# Patient Record
Sex: MALE | Race: WHITE | NOT HISPANIC OR LATINO | Employment: UNEMPLOYED | URBAN - METROPOLITAN AREA
[De-identification: names, ages, dates, MRNs, and addresses within clinical notes are randomized per-mention and may not be internally consistent; named-entity substitution may affect disease eponyms.]

---

## 2017-02-19 ENCOUNTER — HOSPITAL ENCOUNTER (EMERGENCY)
Facility: HOSPITAL | Age: 27
Discharge: HOME | End: 2017-02-19

## 2017-02-19 VITALS
BODY MASS INDEX: 23.86 KG/M2 | SYSTOLIC BLOOD PRESSURE: 154 MMHG | DIASTOLIC BLOOD PRESSURE: 95 MMHG | HEART RATE: 100 BPM | RESPIRATION RATE: 20 BRPM | WEIGHT: 180 LBS | HEIGHT: 73 IN | OXYGEN SATURATION: 100 % | TEMPERATURE: 98 F

## 2017-02-19 DIAGNOSIS — R07.9 CHEST PAIN: ICD-10-CM

## 2017-02-19 DIAGNOSIS — R07.89 CHEST WALL PAIN: Primary | ICD-10-CM

## 2017-02-19 PROCEDURE — 25000003 PHARM REV CODE 250: Performed by: PHYSICIAN ASSISTANT

## 2017-02-19 PROCEDURE — 99284 EMERGENCY DEPT VISIT MOD MDM: CPT | Mod: 25

## 2017-02-19 PROCEDURE — 93010 ELECTROCARDIOGRAM REPORT: CPT | Mod: ,,, | Performed by: INTERNAL MEDICINE

## 2017-02-19 PROCEDURE — 93005 ELECTROCARDIOGRAM TRACING: CPT

## 2017-02-19 RX ORDER — DICLOFENAC SODIUM 75 MG/1
75 TABLET, DELAYED RELEASE ORAL 2 TIMES DAILY
Qty: 20 TABLET | Refills: 0 | Status: SHIPPED | OUTPATIENT
Start: 2017-02-19

## 2017-02-19 RX ORDER — HYDROXYZINE PAMOATE 25 MG/1
25 CAPSULE ORAL
Status: COMPLETED | OUTPATIENT
Start: 2017-02-19 | End: 2017-02-19

## 2017-02-19 RX ORDER — ACETAMINOPHEN 500 MG
1000 TABLET ORAL 3 TIMES DAILY PRN
Qty: 30 TABLET | Refills: 0
Start: 2017-02-19

## 2017-02-19 RX ORDER — IBUPROFEN 200 MG
400 TABLET ORAL EVERY 6 HOURS PRN
Qty: 30 TABLET | Refills: 0
Start: 2017-02-19

## 2017-02-19 RX ADMIN — HYDROXYZINE PAMOATE 25 MG: 25 CAPSULE ORAL at 03:02

## 2017-02-19 NOTE — ED NOTES
Pt reports chest pain and shortness of breath began last night after using meth.  States that both symptoms have decreased in severity since that time.

## 2017-02-19 NOTE — DISCHARGE INSTRUCTIONS
Chest Wall Pain: Costochondritis    The chest pain that you have had today is caused by costochondritis. This condition is caused by an inflammation of the cartilage joining your ribs to your breastbone. It is not caused by heart or lung problems. The inflammation may have been brought on by a blow to the chest, lifting heavy objects, intense exercise, or an illness that made you cough and sneeze. It often occurs during times of emotional stress. It can be painful, but it is not dangerous. It usually goes away in 1 to 2 weeks. But it may happen again. Rarely, a more serious condition may cause symptoms similar to costochondritis. Thats why its important to watch for the warning signs listed below.  Home care  Follow these guidelines when caring for yourself at home:  · If you feel that emotional stress is a cause of your condition, try to figure out the sources of that stress. It may not be obvious! Learn ways to deal with the stress in your life. This can include regular exercise, muscle relaxation, meditation, or simply taking time out for yourself. For more information about this, talk with your health care provider. Or go to your local library and look at books on stress reduction.  · You may use acetaminophen or ibuprofen to control pain, unless another pain medicine was prescribed. If you have liver disease or ever had a stomach ulcer, talk with your health care provider before using these medicines.  · You can also help ease pain by using a hot, wet compress or heating pad. Use this with or without a medicated skin cream that helps relieves pain.  · Do stretching exercise as advised by your provider.  · Take any prescribed medicines as directed.  Follow-up care  Follow up with your health care provider, or as advised, if you do not start to get better in the next 2 days.  When to seek medical advice  Call your health care provider right away if any of these occur:  · A change in the type of pain. Call  if it feels different, becomes more serious, lasts longer, or spreads into your shoulder, arm, neck, jaw, or back.  · Shortness of breath or pain gets worse when you breathe  · Weakness, dizziness, or fainting  · Cough with dark-colored sputum (phlegm) or blood  · Abdominal pain  · Dark red or black stools  · Fever of 100.4ºF (38ºC) or higher, or as directed by your health care provider  Date Last Reviewed: 11/24/2014  © 4196-5570 Editorially. [address] [place] [address]. All rights reserved. This information is not intended as a substitute for professional medical care. Always follow your healthcare professional's instructions.

## 2017-02-19 NOTE — ED AVS SNAPSHOT
[facility]  [address] [facility] [address]               Eliecer Prescott Robert   2017  3:05 PM   ED    Description:  Male : 1990   Department:  [facility]            Your Care was Coordinated By:     Provider Role From To    MARLA Benitez Physician Assistant 17 [phone] --      Reason for Visit     Chest Pain           Diagnoses this Visit        Comments    Chest wall pain    -  Primary     Chest pain           ED Disposition     ED Disposition Condition Comment    Discharge             To Do List           Follow-up Information     Follow up with Primary Doctor No In 1 week(s).       These Medications        Disp Refills Start End    ibuprofen (ADVIL,MOTRIN) 200 MG tablet 30 tablet 0 2017     Take 2 tablets (400 mg total) by mouth every 6 (six) hours as needed for Pain. - Oral    acetaminophen (TYLENOL) 500 MG tablet 30 tablet 0 2017     Take 2 tablets (1,000 mg total) by mouth 3 (three) times daily as needed for Pain. - Oral    diclofenac (VOLTAREN) 75 MG EC tablet 20 tablet 0 2017     Take 1 tablet (75 mg total) by mouth 2 (two) times daily. - Oral      [facility]     [facility] -  Assistance  Registered nurses in the [facility] provide clinical advisement, health education, appointment booking, and other advisory services.  Call for this free service at [phone].             Medications           Message regarding Medications     Verify the changes and/or additions to your medication regime listed below are the same as discussed with your clinician today.  If any of these changes or additions are incorrect, please notify your healthcare provider.        START taking these NEW medications        Refills    ibuprofen (ADVIL,MOTRIN) 200 MG tablet 0    Sig: Take 2 tablets (400 mg total) by mouth every 6 (six) hours as needed for Pain.    Class: No Print     "Route: Oral    acetaminophen (TYLENOL) 500 MG tablet 0    Sig: Take 2 tablets (1,000 mg total) by mouth 3 (three) times daily as needed for Pain.    Class: No Print    Route: Oral    diclofenac (VOLTAREN) 75 MG EC tablet 0    Sig: Take 1 tablet (75 mg total) by mouth 2 (two) times daily.    Class: Print    Route: Oral      These medications were administered today        Dose Freq    hydrOXYzine pamoate capsule 25 mg 25 mg ED 1 Time    Sig: Take 1 capsule (25 mg total) by mouth ED 1 Time.    Class: Normal    Route: Oral    Cosign for Ordering: Required by Shweta Davis MD           Verify that the below list of medications is an accurate representation of the medications you are currently taking.  If none reported, the list may be blank. If incorrect, please contact your healthcare provider. Carry this list with you in case of emergency.           Current Medications     acetaminophen (TYLENOL) 500 MG tablet Take 2 tablets (1,000 mg total) by mouth 3 (three) times daily as needed for Pain.    diclofenac (VOLTAREN) 75 MG EC tablet Take 1 tablet (75 mg total) by mouth 2 (two) times daily.    ibuprofen (ADVIL,MOTRIN) 200 MG tablet Take 2 tablets (400 mg total) by mouth every 6 (six) hours as needed for Pain.           Clinical Reference Information           Your Vitals Were     BP Pulse Temp Resp Height Weight    154/95 (BP Location: Right arm, Patient Position: Sitting) 100 98 °F (36.7 °C) (Oral) 20 6' 1" (1.854 m) 81.6 kg (180 lb)    SpO2 BMI             100% 23.75 kg/m2         Allergies as of 2/19/2017        Reactions    Codeine Itching      Immunizations Administered on Date of Encounter - 2/19/2017     None      ED Micro, Lab, POCT     None      ED Imaging Orders     Start Ordered       Status Ordering Provider    02/19/17 1519 02/19/17 1518  X-Ray Chest PA And Lateral  1 time imaging      Final result         Discharge Instructions           Chest Wall Pain: Costochondritis    The chest pain that you " have had today is caused by costochondritis. This condition is caused by an inflammation of the cartilage joining your ribs to your breastbone. It is not caused by heart or lung problems. The inflammation may have been brought on by a blow to the chest, lifting heavy objects, intense exercise, or an illness that made you cough and sneeze. It often occurs during times of emotional stress. It can be painful, but it is not dangerous. It usually goes away in 1 to 2 weeks. But it may happen again. Rarely, a more serious condition may cause symptoms similar to costochondritis. Thats why its important to watch for the warning signs listed below.  Home care  Follow these guidelines when caring for yourself at home:  · If you feel that emotional stress is a cause of your condition, try to figure out the sources of that stress. It may not be obvious! Learn ways to deal with the stress in your life. This can include regular exercise, muscle relaxation, meditation, or simply taking time out for yourself. For more information about this, talk with your health care provider. Or go to your local library and look at books on stress reduction.  · You may use acetaminophen or ibuprofen to control pain, unless another pain medicine was prescribed. If you have liver disease or ever had a stomach ulcer, talk with your health care provider before using these medicines.  · You can also help ease pain by using a hot, wet compress or heating pad. Use this with or without a medicated skin cream that helps relieves pain.  · Do stretching exercise as advised by your provider.  · Take any prescribed medicines as directed.  Follow-up care  Follow up with your health care provider, or as advised, if you do not start to get better in the next 2 days.  When to seek medical advice  Call your health care provider right away if any of these occur:  · A change in the type of pain. Call if it feels different, becomes more serious, lasts longer, or  spreads into your shoulder, arm, neck, jaw, or back.  · Shortness of breath or pain gets worse when you breathe  · Weakness, dizziness, or fainting  · Cough with dark-colored sputum (phlegm) or blood  · Abdominal pain  · Dark red or black stools  · Fever of 100.4ºF (38ºC) or higher, or as directed by your health care provider  Date Last Reviewed: 11/24/2014  © 5763-9614 [employer]. [address] [place] [address], [place] [address]. All rights reserved. This information is not intended as a substitute for professional medical care. Always follow your healthcare professional's instructions.          Smoking Cessation     If you would like to quit smoking:   You may be eligible for free services if you are a [place] resident and started smoking cigarettes before September 1, 1988.  Call the [employer]oking [employer]essation [employer] ([employer]) toll free at (943) 343-8693 or (451) 304-2745.   Call 1-800-QUIT-NOW if you do not meet the above criteria.             [facility] complies with applicable Federal civil rights laws and does not discriminate on the basis of race, color, national origin, age, disability, or sex.        Language Assistance Services     ATTENTION: Language assistance services are available, free of charge. Please call [phone].      ATENCIÓN: Si habla miriam, tiene a vo disposición servicios gratuitos de asistencia lingüística. Llame al [phone].     CHÚ Ý: N?u b?n nói Ti?ng Vi?t, có các d?ch v? h? tr? ngôn ng? mi?n phí dành cho b?n. G?i s? [phone].

## 2017-02-19 NOTE — ED PROVIDER NOTES
"SCRIBE #1 NOTE: I, Howie Flannery, am scribing for, and in the presence of, MARLA Shell. I have scribed the entire note.      History      Chief Complaint   Patient presents with    Chest Pain     Pt states, "I had bad chest pains last night and it came back today, I looked it up and I'm having all the symptoms of heart failure".       Review of patient's allergies indicates:   Allergen Reactions    Codeine Itching        HPI   HPI    2/19/2017, 3:18 PM   History obtained from the patient      History of Present Illness: Eliecer Jones is a 26 y.o. male patient who presents to the Emergency Department for chest pain which onset gradually last night after injecting meth at a party. Symptoms are intermittent and moderate in severity. The patient describes the sxs as a burning across his chest. Pt has had increased anxiety recently. Pt is a recovering heroin addict. No mitigating or exacerbating factors reported. Associated sxs include dizziness and SOB. Patient denies any leg swelling, palpitations, HA, n/v/d, cough, numbness, weakness, and all other sxs at this time. No further complaints or concerns at this time.         Arrival mode: Personal vehicle      PCP: Primary Doctor No     Past Medical History:  Past medical history reviewed not relevant      Past Surgical History:  Past Surgical History   Procedure Laterality Date    Hernia repair           Family History:  Family history reviewed not relevant    Social History:  Social History     Social History Main Topics    Smoking status: Former Smoker     Types: Cigarettes     Quit date: 1/29/2017    Smokeless tobacco: Unknown    Alcohol use No    Drug use: Yes    Sexual activity: Unknown       ROS   Review of Systems   Constitutional: Negative for chills and fever.   HENT: Negative for sore throat.    Respiratory: Positive for shortness of breath. Negative for cough.    Cardiovascular: Positive for chest pain. Negative for palpitations and leg " "swelling.   Gastrointestinal: Negative for diarrhea, nausea and vomiting.   Genitourinary: Negative for dysuria.   Musculoskeletal: Negative for back pain.   Skin: Negative for rash.   Neurological: Positive for dizziness. Negative for weakness, light-headedness, numbness and headaches.   Hematological: Does not bruise/bleed easily.   Psychiatric/Behavioral: The patient is nervous/anxious.        Physical Exam    Initial Vitals   BP Pulse Resp Temp SpO2   02/19/17 1503 02/19/17 1503 02/19/17 1503 02/19/17 1503 02/19/17 1503   154/95 100 20 98 °F (36.7 °C) 100 %      Physical Exam  Nursing Notes and Vital Signs Reviewed.  Constitutional: Patient is in no acute distress. Awake and alert. Well-developed and well-nourished.  Head: Atraumatic. Normocephalic.  Eyes: PERRL. EOM intact. Conjunctivae are not pale. No scleral icterus.  ENT: Mucous membranes are moist. Oropharynx is clear and symmetric.    Neck: Supple. Full ROM. No lymphadenopathy.  Cardiovascular: Regular rate. Regular rhythm. No murmurs, rubs, or gallops. Distal pulses are 2+ and symmetric.  Pulmonary/Chest: No respiratory distress. Clear to auscultation bilaterally. No wheezing, rales, or rhonchi.  Abdominal: Soft and non-distended.  There is no tenderness.  No rebound, guarding, or rigidity.  Good bowel sounds.    Musculoskeletal: Moves all extremities. No obvious deformities. No edema. No calf tenderness.  Skin: Warm and dry.  Neurological:  Alert, awake, and appropriate.  Normal speech.  No acute focal neurological deficits are appreciated.  Psychiatric: Anxious. Good eye contact. Appropriate in content.    ED Course    Procedures  ED Vital Signs:  Vitals:    02/19/17 1503   BP: (!) 154/95   Pulse: 100   Resp: 20   Temp: 98 °F (36.7 °C)   TempSrc: Oral   SpO2: 100%   Weight: 81.6 kg (180 lb)   Height: 6' 1" (1.854 m)           Imaging Results:  Imaging Results         X-Ray Chest PA And Lateral (Final result) Result time:  02/19/17 15:50:53    Final " result by Braulio Villanueva MD (02/19/17 15:50:53)    Impression:         Negative two-view chest x-ray.      Electronically signed by: BRAULIO VILLANUEVA MD  Date:     02/19/17  Time:    15:50     Narrative:    XR CHEST PA AND LATERAL, 02/19/17 15:31:06    Clinical indication: Chest pain    Findings:   Heart size is normal. The lung fields are clear. No acute pulmonary infiltrate.             The EKG was ordered, reviewed, and independently interpreted by the ED provider.  Interpretation time: 15:09  Rate: 79 BPM  Rhythm: normal sinus rhythm  Interpretation: Normal ECG. No STEMI.           The Emergency Provider reviewed the vital signs and test results, which are outlined above.    ED Discussion     3:56 PM: Discussed with pt all pertinent ED information and results. Discussed pt dx and plan of tx. Gave pt all f/u and return to the ED instructions. All questions and concerns were addressed at this time. Pt expresses understanding of information and instructions, and is comfortable with plan to discharge. Pt is stable for discharge.    I have discussed with patient and/or family/caretaker chest pain precautions, specifically to return for worsening chest pain, shortness of breath, fever, or any concern.  I have low suspicion for cardiopulmonary, vascular, infectious, respiratory, or other emergent medical condition based on my evaluation in the ED.    Pre-hypertension/Hypertension: The pt has been informed that they may have pre-hypertension or hypertension based on a blood pressure reading in the ED. I recommend that the pt call the PCP listed on their discharge instructions or a physician of their choice this week to arrange f/u for further evaluation of possible pre-hypertension or hypertension.       ED Medication(s):  Medications   hydrOXYzine pamoate capsule 25 mg (25 mg Oral Given 2/19/17 1538)       Discharge Medication List as of 2/19/2017  3:53 PM      START taking these medications    Details    acetaminophen (TYLENOL) 500 MG tablet Take 2 tablets (1,000 mg total) by mouth 3 (three) times daily as needed for Pain., Starting 2/19/2017, Until Discontinued, No Print      diclofenac (VOLTAREN) 75 MG EC tablet Take 1 tablet (75 mg total) by mouth 2 (two) times daily., Starting 2/19/2017, Until Discontinued, Print      ibuprofen (ADVIL,MOTRIN) 200 MG tablet Take 2 tablets (400 mg total) by mouth every 6 (six) hours as needed for Pain., Starting 2/19/2017, Until Discontinued, No Print             Follow-up Information     Follow up with Primary Doctor No In 1 week(s).            Medical Decision Making    Medical Decision Making:   Clinical Tests:   Radiological Study: Ordered and Reviewed  Medical Tests: Ordered and Reviewed           Scribe Attestation:   Scribe #1: I performed the above scribed service and the documentation accurately describes the services I performed. I attest to the accuracy of the note.    Attending:   Physician Attestation Statement for Scribe #1: I, MARLA Shell, personally performed the services described in this documentation, as scribed by Howie Flannery, in my presence, and it is both accurate and complete.          Clinical Impression       ICD-10-CM ICD-9-CM   1. Chest wall pain R07.89 786.52   2. Chest pain R07.9 786.50       Disposition:   Disposition: Discharged  Condition: Stable           MARLA Benitez  02/21/17 0808